# Patient Record
Sex: FEMALE | Race: WHITE | NOT HISPANIC OR LATINO | URBAN - METROPOLITAN AREA
[De-identification: names, ages, dates, MRNs, and addresses within clinical notes are randomized per-mention and may not be internally consistent; named-entity substitution may affect disease eponyms.]

---

## 2017-02-27 ENCOUNTER — APPOINTMENT (OUTPATIENT)
Dept: URBAN - METROPOLITAN AREA CLINIC 219 | Age: 22
Setting detail: DERMATOLOGY
End: 2017-02-27

## 2017-02-27 DIAGNOSIS — Z80.8 FAMILY HISTORY OF MALIGNANT NEOPLASM OF OTHER ORGANS OR SYSTEMS: ICD-10-CM

## 2017-02-27 DIAGNOSIS — L70.0 ACNE VULGARIS: ICD-10-CM

## 2017-02-27 DIAGNOSIS — L30.8 OTHER SPECIFIED DERMATITIS: ICD-10-CM

## 2017-02-27 PROBLEM — L30.9 DERMATITIS, UNSPECIFIED: Status: ACTIVE | Noted: 2017-02-27

## 2017-02-27 PROCEDURE — OTHER COUNSELING: OTHER

## 2017-02-27 PROCEDURE — 99213 OFFICE O/P EST LOW 20 MIN: CPT

## 2017-02-27 PROCEDURE — OTHER TREATMENT REGIMEN: OTHER

## 2017-02-27 PROCEDURE — OTHER MIPS QUALITY: OTHER

## 2017-02-27 PROCEDURE — OTHER PRESCRIPTION: OTHER

## 2017-02-27 RX ORDER — DOXYCYCLINE 100 MG/1
ONE CAPSULE ORAL AS DIRECTED
Qty: 60 | Refills: 5 | Status: ERX

## 2017-02-27 ASSESSMENT — LOCATION DETAILED DESCRIPTION DERM: LOCATION DETAILED: LEFT CENTRAL MALAR CHEEK

## 2017-02-27 ASSESSMENT — LOCATION SIMPLE DESCRIPTION DERM: LOCATION SIMPLE: LEFT CHEEK

## 2017-02-27 ASSESSMENT — LOCATION ZONE DERM: LOCATION ZONE: FACE

## 2017-02-27 NOTE — PROCEDURE: TREATMENT REGIMEN
Detail Level: Zone
Continue Regimen: Doxycycline Monohydrate 100mg twice a day with food/water \\nMild Cleansers (Cetaphil or Cerave)\\nOlay Complete Moisturizer with Sunscreen daily
Detail Level: Simple
Plan: Add OTC Differin Gel 0.1% at night as tolerated \\nAdd OTC Benzoyl Peroxide Gel 10% in the morning as tolerated (cheeks/chin)
Continue Regimen: Triamcinolone Cream 0.1% twice a day as needed \\nEmollients \\nMild Cleansers
Otc Regimen: [facility] [employer] 3-4 times a day

## 2018-03-23 ENCOUNTER — APPOINTMENT (OUTPATIENT)
Dept: URBAN - METROPOLITAN AREA CLINIC 219 | Age: 23
Setting detail: DERMATOLOGY
End: 2018-03-23

## 2018-03-23 DIAGNOSIS — B07.8 OTHER VIRAL WARTS: ICD-10-CM

## 2018-03-23 DIAGNOSIS — Z80.8 FAMILY HISTORY OF MALIGNANT NEOPLASM OF OTHER ORGANS OR SYSTEMS: ICD-10-CM

## 2018-03-23 DIAGNOSIS — L30.8 OTHER SPECIFIED DERMATITIS: ICD-10-CM

## 2018-03-23 DIAGNOSIS — L70.0 ACNE VULGARIS: ICD-10-CM

## 2018-03-23 PROCEDURE — 99213 OFFICE O/P EST LOW 20 MIN: CPT

## 2018-03-23 PROCEDURE — OTHER TREATMENT REGIMEN: OTHER

## 2018-03-23 PROCEDURE — OTHER COUNSELING: OTHER

## 2018-03-23 PROCEDURE — OTHER MIPS QUALITY: OTHER

## 2018-03-23 PROCEDURE — OTHER PRESCRIPTION: OTHER

## 2018-03-23 RX ORDER — TRIAMCINOLONE ACETONIDE 1 MG/G
APPLY CREAM TOPICAL
Qty: 1 | Refills: 3 | Status: ERX

## 2018-03-23 RX ORDER — DOXYCYCLINE 100 MG/1
ONE CAPSULE ORAL AS DIRECTED
Qty: 60 | Refills: 5 | Status: ERX

## 2018-03-23 ASSESSMENT — LOCATION ZONE DERM
LOCATION ZONE: HAND
LOCATION ZONE: FACE

## 2018-03-23 ASSESSMENT — LOCATION DETAILED DESCRIPTION DERM
LOCATION DETAILED: LEFT HYPOTHENAR EMINENCE
LOCATION DETAILED: LEFT CENTRAL MALAR CHEEK

## 2018-03-23 ASSESSMENT — LOCATION SIMPLE DESCRIPTION DERM
LOCATION SIMPLE: LEFT HAND
LOCATION SIMPLE: LEFT CHEEK

## 2018-03-23 NOTE — PROCEDURE: TREATMENT REGIMEN
Plan: Patient declines cryotherapy\\nOTC Salicyclic acid 17% paint-on solution\\nKeep hands moisturized\\nAvoid picking
Detail Level: Simple
Continue Regimen: Doxycycline Monohydrate 100mg once a day with food/water, may increase to twice a day as needed for flares (patient counseled re: 2 forms of contraception)\\nOTC Differin Gel 0.1% at night as tolerated (increase use)\\nMild Cleansers (Cetaphil or Cerave)\\nOlay complete lotion in the morning\\nVanicream lite lotion at night
Otc Regimen: [employer] [employer] 3-4 times a day
Continue Regimen: Triamcinolone Cream 0.1% twice a day as needed \\nEmollients \\nMild Cleansers
Detail Level: Zone